# Patient Record
Sex: MALE | Race: WHITE | Employment: FULL TIME | URBAN - METROPOLITAN AREA
[De-identification: names, ages, dates, MRNs, and addresses within clinical notes are randomized per-mention and may not be internally consistent; named-entity substitution may affect disease eponyms.]

---

## 2020-02-18 ENCOUNTER — OFFICE VISIT (OUTPATIENT)
Dept: DERMATOLOGY | Facility: CLINIC | Age: 70
End: 2020-02-18
Payer: COMMERCIAL

## 2020-02-18 VITALS — SYSTOLIC BLOOD PRESSURE: 136 MMHG | DIASTOLIC BLOOD PRESSURE: 84 MMHG | HEART RATE: 65 BPM | OXYGEN SATURATION: 95 %

## 2020-02-18 DIAGNOSIS — L82.1 SEBORRHEIC KERATOSIS: ICD-10-CM

## 2020-02-18 DIAGNOSIS — L81.4 LENTIGO: ICD-10-CM

## 2020-02-18 DIAGNOSIS — I87.2 STASIS DERMATITIS OF BOTH LEGS: Primary | ICD-10-CM

## 2020-02-18 PROCEDURE — 99204 OFFICE O/P NEW MOD 45 MIN: CPT | Performed by: DERMATOLOGY

## 2020-02-18 RX ORDER — METOPROLOL SUCCINATE 50 MG/1
TABLET, EXTENDED RELEASE ORAL
COMMUNITY
Start: 2019-12-06

## 2020-02-18 RX ORDER — CHLORHEXIDINE GLUCONATE ORAL RINSE 1.2 MG/ML
SOLUTION DENTAL
COMMUNITY
Start: 2019-09-18

## 2020-02-18 RX ORDER — DIMENHYDRINATE 50 MG
TABLET ORAL
COMMUNITY
Start: 2012-01-24

## 2020-02-18 RX ORDER — LANOLIN ALCOHOL/MO/W.PET/CERES
1000 CREAM (GRAM) TOPICAL
COMMUNITY
Start: 2019-05-30

## 2020-02-18 RX ORDER — WARFARIN SODIUM 5 MG/1
TABLET ORAL
COMMUNITY
Start: 2020-01-23

## 2020-02-18 RX ORDER — AMMONIUM LACTATE 12 G/100G
LOTION TOPICAL
COMMUNITY
Start: 2019-03-28

## 2020-02-18 RX ORDER — NITROGLYCERIN 0.4 MG/1
0.4 TABLET SUBLINGUAL
COMMUNITY
Start: 2018-12-05

## 2020-02-18 RX ORDER — ASPIRIN 325 MG
TABLET ORAL
COMMUNITY
Start: 2008-08-06

## 2020-02-18 RX ORDER — AMMONIUM LACTATE 12 G/100G
LOTION TOPICAL
COMMUNITY
Start: 2019-02-13

## 2020-02-18 RX ORDER — ALBUTEROL SULFATE 90 UG/1
AEROSOL, METERED RESPIRATORY (INHALATION)
COMMUNITY
Start: 2019-06-26

## 2020-02-18 RX ORDER — FLECAINIDE ACETATE 100 MG/1
TABLET ORAL
COMMUNITY
Start: 2020-01-04

## 2020-02-18 RX ORDER — ALBUTEROL SULFATE 90 UG/1
2 AEROSOL, METERED RESPIRATORY (INHALATION)
COMMUNITY
Start: 2019-06-26

## 2020-02-18 RX ORDER — LISINOPRIL 30 MG/1
TABLET ORAL
COMMUNITY
Start: 2020-01-11

## 2020-02-18 RX ORDER — BETAMETHASONE DIPROPIONATE 0.5 MG/G
CREAM TOPICAL
Qty: 100 G | Refills: 3 | Status: SHIPPED | OUTPATIENT
Start: 2020-02-18

## 2020-02-18 RX ORDER — HYDROCHLOROTHIAZIDE 25 MG/1
TABLET ORAL
COMMUNITY
Start: 2020-01-23

## 2020-02-18 NOTE — NURSING NOTE
"Initial /84 (BP Location: Left arm, Patient Position: Sitting, Cuff Size: Adult Large)   Pulse 65   SpO2 95%  Estimated body mass index is 41 kg/m  as calculated from the following:    Height as of 4/20/12: 1.803 m (5' 11\").    Weight as of 4/20/12: 133.4 kg (294 lb). .      "

## 2020-02-18 NOTE — PATIENT INSTRUCTIONS
You can take a Claritin every morning and Zyrtec in the evening to help with the itching.   To reduce swelling in the leg   Don't stand for long periods.   Take regular walks.   Elevate your feet when sitting: if your legs are swollen they need to be above your hips to drain effectively.   Elevate the foot of your bed overnight.   Once the dermatitis is under control, wear special graduated compression stockings long term.    To treat the dermatitis   Dry up oozing patches with dilute vinegar on gauze as compresses.   Topical  cream daily  Return to clinic 2 months  Use vanicream (or thick moisturizer) daily  Try not to scratch: it keeps the dermatitis going.   Protect your skin from injury: this can result in infection or ulceration.  Vinegar is 5% acetic acid. Make a 1% solution by adding   cup of vinegar (white or brown) to 1 pint of water. 1-2 times a week.  Proper skin care from Dr. Cornejo- [facility]     Eliminate harsh soaps, i.e. Dial, Zest, [place];   Use mild soaps such as Cetaphil or Dove Sensitive Skin   Avoid hot or cold showers   After showering, lightly dry off.    Aggressive use of a moisturizer (including Vanicream, Cetaphil, Aquaphor or Cerave)   We recommend using a tub that needs to be scooped out, not a pump. This has more of an oil base. It will hold moisture in your skin much better than a water base moisturizer. The ones recommended are non- pore clogging.   If you have any questions call [phone] and follow the prompts to Dr. Cornejo's office.

## 2020-02-18 NOTE — LETTER
2/18/2020         RE: Toño Shelley  [address] [facility] [address]        Dear Colleague,    Thank you for referring your patient, Toño Shelley, to the [facility]. Please see a copy of my visit note below.    Toño Shelley is a 69 year old year old male patient here today I wasked ot see by Dr. Flower for rash on legs.  Patient states this has been present for a while.  Patient reports the following symptoms:  itching.  Patient reports the following previous treatments none.  These treatments did not work.  Patient reports the following modifying factors none.  Associated symptoms: swelling.  Putting alcohol on legs. .  Patient has no other skin complaints today.  Remainder of the HPI, Meds, PMH, Allergies, FH, and SH was reviewed in chart.    History reviewed. No pertinent past medical history.    History reviewed. No pertinent surgical history.     Family History   Problem Relation Age of Onset     Melanoma No family hx of        Social History     Socioeconomic History     Marital status:      Spouse name: Not on file     Number of children: Not on file     Years of education: Not on file     Highest education level: Not on file   Occupational History     Not on file   Social Needs     Financial resource strain: Not on file     Food insecurity:     Worry: Not on file     Inability: Not on file     Transportation needs:     Medical: Not on file     Non-medical: Not on file   Tobacco Use     Smoking status: Former Smoker     Smokeless tobacco: Former User     Types: Chew   Substance and Sexual Activity     Alcohol use: Not on file     Drug use: Not on file     Sexual activity: Not on file   Lifestyle     Physical activity:     Days per week: Not on file     Minutes per session: Not on file     Stress: Not on file   Relationships     Social connections:     Talks on phone: Not on file     Gets together: Not on file     Attends Hoahaoism service: Not on file     Active member of  club or organization: Not on file     Attends meetings of clubs or organizations: Not on file     Relationship status: Not on file     Intimate partner violence:     Fear of current or ex partner: Not on file     Emotionally abused: Not on file     Physically abused: Not on file     Forced sexual activity: Not on file   Other Topics Concern     Not on file   Social History Narrative     Not on file       Outpatient Encounter Medications as of 2/18/2020   Medication Sig Dispense Refill     albuterol 108 (90 Base) MCG/ACT IN inhaler Inhale 2 puffs into the lungs       ammonium lactate 12 % EX external lotion        ammonium lactate 12 % EX external lotion        aspirin 325 MG PO tablet        BREO ELLIPTA 100-25 MCG/INH IN inhaler INHALE 1 PUFF BY MOUTH ONCE DAILY       chlorhexidine 0.12 % MT solution GENTLE RINSE 15MLS AND LET FLOW OUT EVERY MORNING AND AT BEDTIME       cholecalciferol (VITAMIN D-1000 MAX ST) 25 MCG (1000 UT) PO TABS Take 1,000 Units by mouth       cyanocobalamin 1000 MCG PO tablet Take 1,000 mcg by mouth       DIGOXIN PO Take 125 mcg by mouth daily.       Flaxseed, Linseed, (FLAX SEED OIL) 1000 MG PO capsule        flecainide 100 MG PO tablet        FLECAINIDE ACETATE PO Take  by mouth 2 times daily.       Glucosamine-Chondroitin 250-200 MG PO TABS Take 1 tablet by mouth       hydrochlorothiazide 25 MG PO tablet        lisinopril 30 MG PO tablet        metoprolol succinate ER 50 MG PO 24 hr tablet        METOPROLOL SUCCINATE PO Take 25 mg by mouth daily.       nitroGLYcerin 0.4 MG SL sublingual tablet Place 0.4 mg under the tongue       VENTOLIN  (90 Base) MCG/ACT IN inhaler INHALE 2 PUFFS BY MOUTH 4 TIMES DAILY AS NEEDED       warfarin ANTICOAGULANT 5 MG PO tablet Take 5 mg on Mon, Wed, Fri; 7.5 mg all other days or as directed by Anticoagulation clinic take 12.5mgs on 12/23/20       WARFARIN SODIUM PO Take  by mouth. 7.5mg on Sat, Sun, Tues, Thurs and 5mg Mon, Wed.        No  facility-administered encounter medications on file as of 2/18/2020.              Review Of Systems  Skin: As above  Eyes: negative  Ears/Nose/Throat: negative  Respiratory: No shortness of breath, dyspnea on exertion, cough, or hemoptysis  Cardiovascular: negative  Gastrointestinal: negative  Genitourinary: negative  Musculoskeletal: negative  Neurologic: negative  Psychiatric: negative  Hematologic/Lymphatic/Immunologic: negative  Endocrine: negative      O:   NAD, WDWN, Alert & Oriented, Mood & Affect wnl, Vitals stable   Here today alone   /84 (BP Location: Left arm, Patient Position: Sitting, Cuff Size: Adult Large)   Pulse 65   SpO2 95%    General appearance normal   Vitals stable   Alert, oriented and in no acute distress     BL legs pitting edema and eczematous changes     Stuck on papules and brown macules on trunk and ext      The remainder of expanded problem focused exam was unremarkable; the following areas were examined:  scalp/hair, conjunctiva/lids, face, neck, lips, chest, digits/nails, RUE, LUE.      Eyes: Conjunctivae/lids:Normal     ENT: Lips, buccal mucosa, tongue: normal    MSK:Normal    Cardiovascular: peripheral edema none    Pulm: Breathing Normal    Lymph Nodes: No Head and Neck Lymphadenopathy     Neuro/Psych: Orientation:Alert and Orientedx3 ; Mood/Affect:normal       A/P:  1. Seborrheic keratosis, lentigo,   BENIGN LESIONS DISCUSSED WITH PATIENT:  I discussed the specifics of tumor, prognosis, and genetics of benign lesions.  I explained that treatment of these lesions would be purely cosmetic and not medically neccessary.  I discussed with patient different removal options including excision, cautery and /or laser.      2. Stasis derm  To reduce swelling in the leg   Don't stand for long periods.   Take regular walks.   Elevate your feet when sitting: if your legs are swollen they need to be above your hips to drain effectively.   Elevate the foot of your bed overnight.   Once  the dermatitis is under control, wear special graduated compression stockings long term.    To treat the dermatitis   Dry up oozing patches with dilute vinegar on gauze as compresses.   Topical betamethasone cream daily  Return to clinic 2 months  Use vanicream daily  Try not to scratch: it keeps the dermatitis going.   Protect your skin from injury: this can result in infection or ulceration.  Vinegar is 5% acetic acid. Make a 1% solution by adding   cup of vinegar (white or brown) to 1 pint of water.     Skin care regimen reviewed with patient: Eliminate harsh soaps, i.e. Dial, zest, [place]; Mild soaps such as Cetaphil or Dove sensitive skin, avoid hot or cold showers, aggressive use of emollients including vanicream, cetaphil or cerave discussed with patient.        Again, thank you for allowing me to participate in the care of your patient.        Sincerely,        Yared Cornejo MD

## 2020-02-18 NOTE — PROGRESS NOTES
Toño Shelley is a 69 year old year old male patient here today I wasked ot see by Dr. Flower for rash on legs.  Patient states this has been present for a while.  Patient reports the following symptoms:  itching.  Patient reports the following previous treatments none.  These treatments did not work.  Patient reports the following modifying factors none.  Associated symptoms: swelling.  Putting alcohol on legs. .  Patient has no other skin complaints today.  Remainder of the HPI, Meds, PMH, Allergies, FH, and SH was reviewed in chart.    History reviewed. No pertinent past medical history.    History reviewed. No pertinent surgical history.     Family History   Problem Relation Age of Onset     Melanoma No family hx of        Social History     Socioeconomic History     Marital status:      Spouse name: Not on file     Number of children: Not on file     Years of education: Not on file     Highest education level: Not on file   Occupational History     Not on file   Social Needs     Financial resource strain: Not on file     Food insecurity:     Worry: Not on file     Inability: Not on file     Transportation needs:     Medical: Not on file     Non-medical: Not on file   Tobacco Use     Smoking status: Former Smoker     Smokeless tobacco: Former User     Types: Chew   Substance and Sexual Activity     Alcohol use: Not on file     Drug use: Not on file     Sexual activity: Not on file   Lifestyle     Physical activity:     Days per week: Not on file     Minutes per session: Not on file     Stress: Not on file   Relationships     Social connections:     Talks on phone: Not on file     Gets together: Not on file     Attends Protestant service: Not on file     Active member of club or organization: Not on file     Attends meetings of clubs or organizations: Not on file     Relationship status: Not on file     Intimate partner violence:     Fear of current or ex partner: Not on file     Emotionally abused: Not on  file     Physically abused: Not on file     Forced sexual activity: Not on file   Other Topics Concern     Not on file   Social History Narrative     Not on file       Outpatient Encounter Medications as of 2/18/2020   Medication Sig Dispense Refill     albuterol 108 (90 Base) MCG/ACT IN inhaler Inhale 2 puffs into the lungs       ammonium lactate 12 % EX external lotion        ammonium lactate 12 % EX external lotion        aspirin 325 MG PO tablet        BREO ELLIPTA 100-25 MCG/INH IN inhaler INHALE 1 PUFF BY MOUTH ONCE DAILY       chlorhexidine 0.12 % MT solution GENTLE RINSE 15MLS AND LET FLOW OUT EVERY MORNING AND AT BEDTIME       cholecalciferol (VITAMIN D-1000 MAX ST) 25 MCG (1000 UT) PO TABS Take 1,000 Units by mouth       cyanocobalamin 1000 MCG PO tablet Take 1,000 mcg by mouth       DIGOXIN PO Take 125 mcg by mouth daily.       Flaxseed, Linseed, (FLAX SEED OIL) 1000 MG PO capsule        flecainide 100 MG PO tablet        FLECAINIDE ACETATE PO Take  by mouth 2 times daily.       Glucosamine-Chondroitin 250-200 MG PO TABS Take 1 tablet by mouth       hydrochlorothiazide 25 MG PO tablet        lisinopril 30 MG PO tablet        metoprolol succinate ER 50 MG PO 24 hr tablet        METOPROLOL SUCCINATE PO Take 25 mg by mouth daily.       nitroGLYcerin 0.4 MG SL sublingual tablet Place 0.4 mg under the tongue       VENTOLIN  (90 Base) MCG/ACT IN inhaler INHALE 2 PUFFS BY MOUTH 4 TIMES DAILY AS NEEDED       warfarin ANTICOAGULANT 5 MG PO tablet Take 5 mg on Mon, Wed, Fri; 7.5 mg all other days or as directed by Anticoagulation clinic take 12.5mgs on 12/23/20       WARFARIN SODIUM PO Take  by mouth. 7.5mg on Sat, Sun, Tues, Thurs and 5mg Mon, Wed.        No facility-administered encounter medications on file as of 2/18/2020.              Review Of Systems  Skin: As above  Eyes: negative  Ears/Nose/Throat: negative  Respiratory: No shortness of breath, dyspnea on exertion, cough, or  hemoptysis  Cardiovascular: negative  Gastrointestinal: negative  Genitourinary: negative  Musculoskeletal: negative  Neurologic: negative  Psychiatric: negative  Hematologic/Lymphatic/Immunologic: negative  Endocrine: negative      O:   NAD, WDWN, Alert & Oriented, Mood & Affect wnl, Vitals stable   Here today alone   /84 (BP Location: Left arm, Patient Position: Sitting, Cuff Size: Adult Large)   Pulse 65   SpO2 95%    General appearance normal   Vitals stable   Alert, oriented and in no acute distress     BL legs pitting edema and eczematous changes     Stuck on papules and brown macules on trunk and ext      The remainder of expanded problem focused exam was unremarkable; the following areas were examined:  scalp/hair, conjunctiva/lids, face, neck, lips, chest, digits/nails, RUE, LUE.      Eyes: Conjunctivae/lids:Normal     ENT: Lips, buccal mucosa, tongue: normal    MSK:Normal    Cardiovascular: peripheral edema none    Pulm: Breathing Normal    Lymph Nodes: No Head and Neck Lymphadenopathy     Neuro/Psych: Orientation:Alert and Orientedx3 ; Mood/Affect:normal       A/P:  1. Seborrheic keratosis, lentigo,   BENIGN LESIONS DISCUSSED WITH PATIENT:  I discussed the specifics of tumor, prognosis, and genetics of benign lesions.  I explained that treatment of these lesions would be purely cosmetic and not medically neccessary.  I discussed with patient different removal options including excision, cautery and /or laser.      2. Stasis derm  To reduce swelling in the leg   Don't stand for long periods.   Take regular walks.   Elevate your feet when sitting: if your legs are swollen they need to be above your hips to drain effectively.   Elevate the foot of your bed overnight.   Once the dermatitis is under control, wear special graduated compression stockings long term.    To treat the dermatitis   Dry up oozing patches with dilute vinegar on gauze as compresses.   Topical betamethasone cream daily  Return to  clinic 2 months  Use vanicream daily  Try not to scratch: it keeps the dermatitis going.   Protect your skin from injury: this can result in infection or ulceration.  Vinegar is 5% acetic acid. Make a 1% solution by adding   cup of vinegar (white or brown) to 1 pint of water.     Skin care regimen reviewed with patient: Eliminate harsh soaps, i.e. Dial, zest, [place] spring; Mild soaps such as Cetaphil or Dove sensitive skin, avoid hot or cold showers, aggressive use of emollients including vanicream, cetaphil or cerave discussed with patient.